# Patient Record
Sex: MALE | Race: OTHER | HISPANIC OR LATINO | URBAN - METROPOLITAN AREA
[De-identification: names, ages, dates, MRNs, and addresses within clinical notes are randomized per-mention and may not be internally consistent; named-entity substitution may affect disease eponyms.]

---

## 2017-12-22 ENCOUNTER — EMERGENCY (EMERGENCY)
Facility: HOSPITAL | Age: 62
LOS: 1 days | Discharge: HOME | End: 2017-12-22
Attending: EMERGENCY MEDICINE | Admitting: EMERGENCY MEDICINE
Payer: COMMERCIAL

## 2017-12-22 VITALS
HEART RATE: 75 BPM | OXYGEN SATURATION: 100 % | RESPIRATION RATE: 16 BRPM | DIASTOLIC BLOOD PRESSURE: 105 MMHG | SYSTOLIC BLOOD PRESSURE: 183 MMHG

## 2017-12-22 VITALS
HEART RATE: 86 BPM | TEMPERATURE: 98 F | SYSTOLIC BLOOD PRESSURE: 200 MMHG | DIASTOLIC BLOOD PRESSURE: 106 MMHG | RESPIRATION RATE: 16 BRPM | OXYGEN SATURATION: 100 %

## 2017-12-22 PROCEDURE — 99285 EMERGENCY DEPT VISIT HI MDM: CPT

## 2017-12-22 RX ORDER — MORPHINE SULFATE 50 MG/1
4 CAPSULE, EXTENDED RELEASE ORAL ONCE
Qty: 0 | Refills: 0 | Status: DISCONTINUED | OUTPATIENT
Start: 2017-12-22 | End: 2017-12-22

## 2017-12-22 RX ORDER — KETOROLAC TROMETHAMINE 30 MG/ML
30 SYRINGE (ML) INJECTION ONCE
Qty: 0 | Refills: 0 | Status: DISCONTINUED | OUTPATIENT
Start: 2017-12-22 | End: 2017-12-22

## 2017-12-22 RX ADMIN — Medication 30 MILLIGRAM(S): at 23:43

## 2017-12-22 RX ADMIN — MORPHINE SULFATE 4 MILLIGRAM(S): 50 CAPSULE, EXTENDED RELEASE ORAL at 23:43

## 2017-12-22 NOTE — ED ADULT NURSE NOTE - OBJECTIVE STATEMENT
Facilitator RN: pt AO x3, ambulatory, c/o left back pain since this morning. Denies any injury, trauma, chest pain, dizziness, SOB and n/v/d at this time. Denies urinary symptoms. Evaluated by provider. Blood obtained and sent to LAB. IV inserted. Right arm 20G. HTN note on arrival. pt reports compliance of his BP meds. Will continue to monitor. report given primary RN.

## 2017-12-22 NOTE — ED PROVIDER NOTE - PROGRESS NOTE DETAILS
Patient now able to smile and has no pain. Feels much better. Will d/c no need for further workup likely MSK especially given the tenderness to muscle per physical exam SIgn out follow-up: Pt states pain resolved after medications. Pain was in left CVA area and persistent since this AM without modifying factors. PT and daughter understood they were to receive labs and UA. Will send UA r/o hematuria for possible passed stone. No focal tenderness on exam. NO fever. No dysuria. Pt ambulates well. JENNIFER. SIgn out follow-up: Pt states pain resolved after medications. Pain was in left CVA area and persistent since this AM without modifying factors. No focal tenderness on exam. No fever. No dysuria. Pt ambulates well. ABBY

## 2017-12-22 NOTE — ED PROVIDER NOTE - MEDICAL DECISION MAKING DETAILS
Ashleigh: 62 M, HTN, R low back pain. No neuro deficits or incontinence. No cancer/ fever /IVDA/trauma. Describes chills. Appears in moderate pain. R lower back muscle tenderness. FROM. 5/5 strength (B) LE. Plan: UA, pain meds, CBC, ESR, CRP.

## 2017-12-22 NOTE — ED ADULT TRIAGE NOTE - CHIEF COMPLAINT QUOTE
pt comes to ED for back pain x 1 day. pt denies taking anything for pain. pt complains pain 10/10 pt appears comfortable in triage. pt denies pain on urination blood in urine. pt has hx of HTN pt took BP meds this morning. pt BP elevated in triage otherwise VSS pt denies HA blurry vision.

## 2017-12-22 NOTE — ED PROVIDER NOTE - ATTENDING CONTRIBUTION TO CARE
I performed a face-to-face evaluation of the patient and performed a history and physical examination. I agree with the history and physical examination.    Ashleigh: 62 M, HTN, R low back pain. No neuro deficits or incontinence. No cancer/ fever /IVDA/trauma. Describes chills. Appears in moderate pain. R lower back muscle tenderness. FROM. 5/5 strength (B) LE. Plan: UA, pain meds, CBC, ESR, CRP.

## 2017-12-22 NOTE — ED PROVIDER NOTE - OBJECTIVE STATEMENT
62M with HTN presenting with back pain this morning. Started acutely in the AM and has been having constant pain since. Has tried icy pack with no alleviation. Denies any incontinence, saddle anesthesia, or leg weakness. Has not lifted anything heavy, no trauma to the area. Laying down worse than walking around but both painful. Pain rated 20/10, unsure how to describe the pain. Daughter saying he was warm in the AM but felt cold and was shaking likely from the pain. Patient deferred translation services to daughter. Patient visibly uncomfortable

## 2017-12-23 RX ORDER — OXYCODONE HYDROCHLORIDE 5 MG/1
1 TABLET ORAL
Qty: 16 | Refills: 0 | OUTPATIENT
Start: 2017-12-23 | End: 2017-12-26

## 2017-12-23 RX ORDER — CYCLOBENZAPRINE HYDROCHLORIDE 10 MG/1
1 TABLET, FILM COATED ORAL
Qty: 15 | Refills: 0 | OUTPATIENT
Start: 2017-12-23 | End: 2017-12-27

## 2017-12-24 LAB
BACTERIA UR CULT: SIGNIFICANT CHANGE UP
SPECIMEN SOURCE: SIGNIFICANT CHANGE UP

## 2024-03-10 ENCOUNTER — EMERGENCY (EMERGENCY)
Facility: HOSPITAL | Age: 69
LOS: 1 days | Discharge: HOME | End: 2024-03-10
Attending: STUDENT IN AN ORGANIZED HEALTH CARE EDUCATION/TRAINING PROGRAM | Admitting: STUDENT IN AN ORGANIZED HEALTH CARE EDUCATION/TRAINING PROGRAM
Payer: MEDICARE

## 2024-03-10 VITALS
SYSTOLIC BLOOD PRESSURE: 155 MMHG | HEART RATE: 77 BPM | RESPIRATION RATE: 17 BRPM | TEMPERATURE: 98 F | DIASTOLIC BLOOD PRESSURE: 87 MMHG | OXYGEN SATURATION: 97 %

## 2024-03-10 VITALS
RESPIRATION RATE: 18 BRPM | OXYGEN SATURATION: 99 % | DIASTOLIC BLOOD PRESSURE: 99 MMHG | TEMPERATURE: 98 F | HEART RATE: 64 BPM | SYSTOLIC BLOOD PRESSURE: 156 MMHG

## 2024-03-10 PROBLEM — I10 ESSENTIAL (PRIMARY) HYPERTENSION: Chronic | Status: ACTIVE | Noted: 2017-12-22

## 2024-03-10 LAB
APPEARANCE UR: CLEAR — SIGNIFICANT CHANGE UP
BILIRUB UR-MCNC: NEGATIVE — SIGNIFICANT CHANGE UP
COLOR SPEC: YELLOW — SIGNIFICANT CHANGE UP
DIFF PNL FLD: NEGATIVE — SIGNIFICANT CHANGE UP
GLUCOSE UR QL: NEGATIVE MG/DL — SIGNIFICANT CHANGE UP
KETONES UR-MCNC: NEGATIVE MG/DL — SIGNIFICANT CHANGE UP
LEUKOCYTE ESTERASE UR-ACNC: NEGATIVE — SIGNIFICANT CHANGE UP
NITRITE UR-MCNC: NEGATIVE — SIGNIFICANT CHANGE UP
PH UR: 7.5 — SIGNIFICANT CHANGE UP (ref 5–8)
PROT UR-MCNC: NEGATIVE MG/DL — SIGNIFICANT CHANGE UP
SP GR SPEC: 1.01 — SIGNIFICANT CHANGE UP (ref 1–1.03)
UROBILINOGEN FLD QL: 0.2 MG/DL — SIGNIFICANT CHANGE UP (ref 0.2–1)

## 2024-03-10 PROCEDURE — 99284 EMERGENCY DEPT VISIT MOD MDM: CPT

## 2024-03-10 RX ORDER — CYCLOBENZAPRINE HYDROCHLORIDE 10 MG/1
1 TABLET, FILM COATED ORAL
Qty: 15 | Refills: 0
Start: 2024-03-10 | End: 2024-03-14

## 2024-03-10 RX ORDER — LIDOCAINE 4 G/100G
1 CREAM TOPICAL ONCE
Refills: 0 | Status: COMPLETED | OUTPATIENT
Start: 2024-03-10 | End: 2024-03-10

## 2024-03-10 RX ORDER — KETOROLAC TROMETHAMINE 30 MG/ML
30 SYRINGE (ML) INJECTION ONCE
Refills: 0 | Status: DISCONTINUED | OUTPATIENT
Start: 2024-03-10 | End: 2024-03-10

## 2024-03-10 RX ORDER — OXYCODONE HYDROCHLORIDE 5 MG/1
5 TABLET ORAL ONCE
Refills: 0 | Status: DISCONTINUED | OUTPATIENT
Start: 2024-03-10 | End: 2024-03-10

## 2024-03-10 RX ORDER — DIAZEPAM 5 MG
5 TABLET ORAL ONCE
Refills: 0 | Status: DISCONTINUED | OUTPATIENT
Start: 2024-03-10 | End: 2024-03-10

## 2024-03-10 RX ADMIN — Medication 30 MILLIGRAM(S): at 12:21

## 2024-03-10 RX ADMIN — Medication 5 MILLIGRAM(S): at 12:20

## 2024-03-10 RX ADMIN — LIDOCAINE 1 PATCH: 4 CREAM TOPICAL at 14:39

## 2024-03-10 NOTE — ED PROVIDER NOTE - PHYSICAL EXAMINATION
Gen: well appearing, in no acute distress   Head: normal appearing  HEENT: normal conjunctiva, oral mucosa moist, vision grossly intact   Lung: no respiratory distress, speaking in full sentences, CTA b/l, no wheeze, crackles or rhonchi   CV: regular rate and rhythm, no murmurs  Abd: soft, non distended, non tender   MSK: no visible deformities, ambulating without difficulty, straight leg raise negative GREY, + paraspinal lumbar tenderness. no midline spinal tenderness. sensation intact to light touch in GREY LE. no saddle anesthesias   Neuro: No focal deficits, AAOx3  Skin: Warm, no rashes, no skin changes overlying back.   Psych: normal affect

## 2024-03-10 NOTE — ED PROVIDER NOTE - ATTENDING CONTRIBUTION TO CARE
68-year-old male past history hypertension presents to ED for left-sided lower back pain since yesterday.  Patient presents with daughter.  They report patient was lifting water on Friday but did not have any pain then.  The next day woke up with severe nonradiating left lower lumbar paraspinal pain.  Denies any midline back pain.  Denies any falls.  Denies any fevers or chills.  Denies abdominal pain nausea vomiting, constipation or diarrhea.  Denies any testicular or penile pain.  Denies any radiation of pain to legs per denies any numbness or weakness.  Denies any bowel or bladder incontinence.  Patient reports being able to ambulate.  Has not taken anything yet for pain today.  Patient with similar pain approximately 7 years ago and was prescribed oxycodone reports improvement.  He denies any hematuria or dysuria.  Denies any fevers, chills, chest pain, shortness breath, palpitations.    Exam as above.  No midline tenderness.  No CVA tenderness.  Left paraspinal lower lumbar tenderness.  No gluteal tenderness bilaterally.  Sensation intact in all 4 extremities, back, abdomen.  Full range of motion of all 4 extremities.  Normal straight leg raise test.  2+ pulses bilateral lower extremities. Motor 5 out of 5 in all 4 extremities.     Patient with left lower lumbar pain.  Did occur 1 day after lifting heavy water bottles but no pain between the lifting and onset of pain which was .  Exam and history not consistent with cauda equina, cord compression, spinal abscess or hematoma.    Plan: Check urine to assess for hematuria or signs of UTI.  Simply, reassess.  No indication for imaging at this time.

## 2024-03-10 NOTE — ED PROVIDER NOTE - PATIENT PORTAL LINK FT
You can access the FollowMyHealth Patient Portal offered by [facility] by registering at the following website: [email or web address]. By joining [facility]’s FollowMyHealth portal, you will also be able to view your health information using other applications (apps) compatible with our system.

## 2024-03-10 NOTE — ED PROVIDER NOTE - NSFOLLOWUPINSTRUCTIONS_ED_ALL_ED_FT
Today you were seen in the emergency room for evaluation of back pain.     We sent a prescription to your pharmacy for a muscle relaxer medication called FLEXERIL. Please avoid driving, operating heavy machinery or drinking alcohol as the medication can make you drowsy by itself.     Back pain is very common in adults. The cause of back pain is rarely dangerous and the pain often gets better over time. The cause of your back pain may not be known and may include strain of muscles or ligaments, degeneration of the spinal disks, or arthritis. Occasionally the pain may radiate down your leg(s). Over-the-counter medicines to reduce pain and inflammation are often the most helpful. Stretching and remaining active frequently helps the healing process.    Your provider today has determined that you do not need an emergent MRI. This does not mean that you may not require an MRI as an outpatient in the future if your pain persists or if you develop additional neurologic symptoms.    It is extremely important for you to follow up with your outpatient provider for further examination and discussion regarding treatment and imaging, if necessary. If you develop any of the following symptoms, return to the ED immediately for re-evaluation:    •Significant trauma or fall, especially if you are over age 65 or have osteoporosis  •Sudden, acute onset of urinary retention or incontinence  •Fecal incontinence  •Loss of sensation (anesthesia) restricted to the area of the buttocks, perineum and inner surfaces of the thighs  •Weakness in the lower limbs    PLEASE FOLLOW UP WITH YOUR PRIMARY CARE DOCTOR WITHIN THE NEXT WEEK

## 2024-03-10 NOTE — ED PROVIDER NOTE - OBJECTIVE STATEMENT
67 y/o M with PMHx of HTN presents to ED for evaluation of L sided low back pain onset yesterday. Patient states symptoms began 1 day after lifting several pains of heavy water. He is having pain with ambulation and movement. States he had a similar presentation in 2017, where he was given medications in the ED, wound up having improvement with oxycodone and was d/c'd with short course of opioids with resolution of symptoms. Denies pain radiating down the legs, numbness or tingling in the extremities, incontinence of urine or stool, urinary symptoms, recent spinal injections or fevers. NKDA.

## 2024-03-10 NOTE — ED PROVIDER NOTE - PROGRESS NOTE DETAILS
Quintin PGY1: on re-assessment, patient states his pain has completely resolved. okay with plan to d/c. will send with rx for Flexeril and PCP f/u. daughter verbalized understanding of plan.

## 2024-03-10 NOTE — ED PROVIDER NOTE - CLINICAL SUMMARY MEDICAL DECISION MAKING FREE TEXT BOX
67 y/o M with PMHx of HTN presents to ED for evaluation of L sided low back pain onset yesterday. Patient states symptoms began 1 day after lifting several pains of heavy water. Suspect pain is likely musculoskeletal in nature, less likely sciatica given negative straight leg. Patient is able to ambulate but appears uncomfortable doing so. No reg flag signs or symptoms concerning for acute cord compression, no fevers/recent injections or concern for spinal epidural abscess. Plan to check UA to ensure no UTI, medicate for pain and re-eval. Consider short low dose course of opioid as necessary, with spine and PCP f/u as outpatient this week.

## 2024-03-10 NOTE — ED ADULT TRIAGE NOTE - CHIEF COMPLAINT QUOTE
Pt c/o left sided lower starting Friday. Pt states pain started after picking up pale of water on Friday.

## 2024-03-12 LAB
-  AMOXICILLIN/CLAVULANIC ACID: SIGNIFICANT CHANGE UP
-  AMPICILLIN/SULBACTAM: SIGNIFICANT CHANGE UP
-  AMPICILLIN: SIGNIFICANT CHANGE UP
-  AZTREONAM: SIGNIFICANT CHANGE UP
-  CEFAZOLIN: SIGNIFICANT CHANGE UP
-  CEFEPIME: SIGNIFICANT CHANGE UP
-  CEFOXITIN: SIGNIFICANT CHANGE UP
-  CEFTRIAXONE: SIGNIFICANT CHANGE UP
-  CEFUROXIME: SIGNIFICANT CHANGE UP
-  CIPROFLOXACIN: SIGNIFICANT CHANGE UP
-  ERTAPENEM: SIGNIFICANT CHANGE UP
-  GENTAMICIN: SIGNIFICANT CHANGE UP
-  LEVOFLOXACIN: SIGNIFICANT CHANGE UP
-  MEROPENEM: SIGNIFICANT CHANGE UP
-  NITROFURANTOIN: SIGNIFICANT CHANGE UP
-  PIPERACILLIN/TAZOBACTAM: SIGNIFICANT CHANGE UP
-  TOBRAMYCIN: SIGNIFICANT CHANGE UP
-  TRIMETHOPRIM/SULFAMETHOXAZOLE: SIGNIFICANT CHANGE UP
CULTURE RESULTS: ABNORMAL
METHOD TYPE: SIGNIFICANT CHANGE UP
ORGANISM # SPEC MICROSCOPIC CNT: ABNORMAL
ORGANISM # SPEC MICROSCOPIC CNT: ABNORMAL
SPECIMEN SOURCE: SIGNIFICANT CHANGE UP